# Patient Record
Sex: MALE | Race: WHITE | NOT HISPANIC OR LATINO | Employment: OTHER | URBAN - METROPOLITAN AREA
[De-identification: names, ages, dates, MRNs, and addresses within clinical notes are randomized per-mention and may not be internally consistent; named-entity substitution may affect disease eponyms.]

---

## 2023-09-08 ENCOUNTER — TELEPHONE (OUTPATIENT)
Dept: PRIMARY CARE | Facility: CLINIC | Age: 43
End: 2023-09-08
Payer: COMMERCIAL

## 2023-09-22 ENCOUNTER — OFFICE VISIT (OUTPATIENT)
Dept: PRIMARY CARE | Facility: CLINIC | Age: 43
End: 2023-09-22
Payer: COMMERCIAL

## 2023-09-22 VITALS
HEIGHT: 73 IN | HEART RATE: 83 BPM | SYSTOLIC BLOOD PRESSURE: 106 MMHG | WEIGHT: 315 LBS | BODY MASS INDEX: 41.75 KG/M2 | DIASTOLIC BLOOD PRESSURE: 70 MMHG | TEMPERATURE: 98.2 F

## 2023-09-22 DIAGNOSIS — E66.01 MORBID OBESITY WITH BMI OF 40.0-44.9, ADULT (MULTI): ICD-10-CM

## 2023-09-22 DIAGNOSIS — F51.04 CHRONIC INSOMNIA: ICD-10-CM

## 2023-09-22 DIAGNOSIS — G47.62 LEG CRAMPS, SLEEP RELATED: ICD-10-CM

## 2023-09-22 DIAGNOSIS — Z00.00 VISIT FOR PREVENTIVE HEALTH EXAMINATION: Primary | ICD-10-CM

## 2023-09-22 PROCEDURE — 4004F PT TOBACCO SCREEN RCVD TLK: CPT | Performed by: FAMILY MEDICINE

## 2023-09-22 PROCEDURE — 99396 PREV VISIT EST AGE 40-64: CPT | Performed by: FAMILY MEDICINE

## 2023-09-22 PROCEDURE — 3008F BODY MASS INDEX DOCD: CPT | Performed by: FAMILY MEDICINE

## 2023-09-22 RX ORDER — METOPROLOL SUCCINATE 25 MG/1
25 TABLET, EXTENDED RELEASE ORAL DAILY
COMMUNITY

## 2023-09-22 RX ORDER — ASPIRIN 81 MG/1
81 TABLET ORAL ONCE
COMMUNITY
Start: 2020-02-03

## 2023-09-22 RX ORDER — TICAGRELOR 60 MG/1
60 TABLET ORAL 2 TIMES DAILY
COMMUNITY
Start: 2023-09-14

## 2023-09-22 RX ORDER — CYCLOBENZAPRINE HCL 10 MG
10 TABLET ORAL NIGHTLY
Qty: 90 TABLET | Refills: 3 | Status: SHIPPED | OUTPATIENT
Start: 2023-09-22

## 2023-09-22 RX ORDER — ATORVASTATIN CALCIUM 80 MG/1
80 TABLET, FILM COATED ORAL DAILY
COMMUNITY

## 2023-09-22 RX ORDER — CYCLOBENZAPRINE HCL 10 MG
10 TABLET ORAL NIGHTLY
COMMUNITY
End: 2023-09-22 | Stop reason: SDUPTHER

## 2023-09-22 ASSESSMENT — PATIENT HEALTH QUESTIONNAIRE - PHQ9
SUM OF ALL RESPONSES TO PHQ9 QUESTIONS 1 AND 2: 0
1. LITTLE INTEREST OR PLEASURE IN DOING THINGS: NOT AT ALL
2. FEELING DOWN, DEPRESSED OR HOPELESS: NOT AT ALL

## 2023-09-22 NOTE — PROGRESS NOTES
"Subjective   Patient ID: Grey Garcia is a 42 y.o. male who presents for Annual Exam.  Very pleasant 42-year-old here today for annual wellness exam  Has history of leg cramps at night managed with cyclobenzaprine with no side effects or issues  Has morbid obesity has been trying to work on diet and exercise  No new family history  No hospital ER visits or surgery or interval changes  Tries to exercise no angina palpitations or syncope  No  symptoms patient is not a smoker  Does not take any supplements or aspirin  No other problems to report he feels well        Review of Systems  Constitutional: no chills, no fever and no night sweats.   Eyes: no blurred vision and no eyesight problems.   ENT: no hearing loss, no nasal congestion, no nasal discharge, no hoarseness and no sore throat.   Cardiovascular: no chest pain, no intermittent leg claudication, no lower extremity edema, no palpitations and no syncope.   Respiratory: no cough, no shortness of breath during exertion, no shortness of breath at rest and no wheezing.   Gastrointestinal: no abdominal pain, no blood in stools, no constipation, no diarrhea, no melena, no nausea, no rectal pain and no vomiting.   Genitourinary: no dysuria, no change in urinary frequency, no urinary hesitancy, no feelings of urinary urgency and no vaginal discharge.   Musculoskeletal: no arthralgias,  no back pain and no myalgias.   Integumentary: no new skin lesions and no rashes.   Neurological: no difficulty walking, no headache, no limb weakness, no numbness and no tingling.   Psychiatric: no anxiety, no depression, no anhedonia and no substance use disorders.   Endocrine: no recent weight gain and no recent weight loss.   Hematologic/Lymphatic: no tendency for easy bruising and no swollen glands .    Objective    /70   Pulse 83   Temp 36.8 °C (98.2 °F)   Ht 1.854 m (6' 1\")   Wt 146 kg (322 lb)   BMI 42.48 kg/m²    Physical Exam  The patient appeared well nourished " and normally developed. Vital signs as documented. Head exam is unremarkable. No scleral icterus or corneal arcus noted.  Pupils are equal round reactive to light extraocular movements are intact no hemorrhages noted on funduscopic exam mouth mucous membranes are moist no exudates ears canals clear TMs are gray pearly not injected nose no rhinorrhea or epistaxis Neck is without jugular venous distension, thyromegaly, or carotid bruits. Carotid upstrokes are brisk bilaterally. Lungs are clear to auscultation and percussion. Cardiac exam reveals the PMI to be normally sized and situated. Rhythm is regular. First and second heart sounds normal. No murmurs, rubs or gallops. Abdominal exam reveals normal bowel sounds, no masses, no organomegaly and no aortic enlargement. Extremities are nonedematous and both femoral and pedal pulses are normal.  Neurologic exam DTRs are equal bilaterally no focal deficits strength is symmetrical heme lymph no palpable lymph nodes in the neck axilla or groin    Assessment/Plan   Problem List Items Addressed This Visit       Chronic insomnia    Relevant Medications    cyclobenzaprine (Flexeril) 10 mg tablet    Leg cramps, sleep related    Relevant Medications    cyclobenzaprine (Flexeril) 10 mg tablet    Visit for preventive health examination - Primary     Normal adult exam  Morbid obesity diet and exercise addressed  Continue annual exams         Relevant Orders    Lipid Panel    Hemoglobin A1C    Comprehensive Metabolic Panel    Hepatitis C antibody    Morbid obesity with BMI of 40.0-44.9, adult (CMS/HCC)     Above normal BMI nutrition and physical activity reviewed          Normal adult exam  Continue annual exams  Above normal BMI nutrition and physical activity reviewed  Leg cramps causing insomnia  Continue the cyclobenzaprine  Weight loss will also help  Continue annual exams       Juli Delvalle MD

## 2023-09-30 PROBLEM — E66.01 MORBID OBESITY WITH BMI OF 40.0-44.9, ADULT (MULTI): Status: ACTIVE | Noted: 2023-09-30

## 2023-09-30 PROBLEM — Z00.00 VISIT FOR PREVENTIVE HEALTH EXAMINATION: Status: ACTIVE | Noted: 2023-09-30

## 2023-10-23 DIAGNOSIS — F41.1 GENERALIZED ANXIETY DISORDER: ICD-10-CM

## 2023-10-25 RX ORDER — ESCITALOPRAM OXALATE 10 MG/1
10 TABLET ORAL DAILY
Qty: 90 TABLET | Refills: 0 | Status: SHIPPED | OUTPATIENT
Start: 2023-10-25 | End: 2024-01-23

## 2023-12-06 ENCOUNTER — OFFICE VISIT (OUTPATIENT)
Dept: SLEEP MEDICINE | Facility: CLINIC | Age: 43
End: 2023-12-06
Payer: COMMERCIAL

## 2023-12-06 DIAGNOSIS — G47.33 OBSTRUCTIVE SLEEP APNEA: Primary | ICD-10-CM

## 2023-12-06 PROCEDURE — 4004F PT TOBACCO SCREEN RCVD TLK: CPT | Performed by: NURSE PRACTITIONER

## 2023-12-06 PROCEDURE — 3008F BODY MASS INDEX DOCD: CPT | Performed by: NURSE PRACTITIONER

## 2023-12-06 PROCEDURE — 99213 OFFICE O/P EST LOW 20 MIN: CPT | Performed by: NURSE PRACTITIONER

## 2023-12-06 ASSESSMENT — SLEEP AND FATIGUE QUESTIONNAIRES
HOW LIKELY ARE YOU TO NOD OFF OR FALL ASLEEP WHEN YOU ARE A PASSENGER IN A CAR FOR AN HOUR WITHOUT A BREAK: NO CHANCE OF DOZING
ESS TOTAL SCORE: 1
HOW LIKELY ARE YOU TO NOD OFF OR FALL ASLEEP WHILE SITTING AND TALKING TO SOMEONE: NO CHANCE OF DOZING
HOW LIKELY ARE YOU TO NOD OFF OR FALL ASLEEP WHILE LYING DOWN TO REST IN THE AFTERNOON WHEN CIRCUMSTANCES PERMIT: SLIGHT CHANCE OF DOZING
SITING INACTIVE IN A PUBLIC PLACE LIKE A CLASS ROOM OR A MOVIE THEATER: NO CHANCE OF DOZING
HOW LIKELY ARE YOU TO NOD OFF OR FALL ASLEEP IN A CAR, WHILE STOPPED FOR A FEW MINUTES IN TRAFFIC: NO CHANCE OF DOZING
HOW LIKELY ARE YOU TO NOD OFF OR FALL ASLEEP WHILE SITTING AND READING: NO CHANCE OF DOZING
HOW LIKELY ARE YOU TO NOD OFF OR FALL ASLEEP WHILE WATCHING TV: NO CHANCE OF DOZING
HOW LIKELY ARE YOU TO NOD OFF OR FALL ASLEEP WHILE SITTING QUIETLY AFTER LUNCH WITHOUT ALCOHOL: NO CHANCE OF DOZING

## 2023-12-06 NOTE — PROGRESS NOTES
Patient: Grey Garcia    [id number]  : 1980 -- AGE 43 y.o.    Provider: GRACIELA Mckinney     Location [facility]   Service Date: 2023              [facility]  Followup Visit Note    Virtual or Telephone Consent  An interactive audio and video telecommunication system which permits real time communications between the patient (at the originating site) and provider (at the distant site) was utilized to provide this telehealth service.   Verbal consent was requested and obtained from Grey Garcia on this date, 23 for a telehealth visit.     HISTORY OF PRESENT ILLNESS       HISTORY OF PRESENT ILLNESS   Grey Garcia is a 43 y.o. male with past medical history of  anxiety, insomnia, smoking, hyperlipidemia, HTN, MAURICE who presents to a [facility] for followup.     PAST SLEEP HISTORY    Sleep study done on  showed severe MAURICE with an RDI 3% of 75.1 and SpO2 sharla of 68%. Recommendation was for auto PAP.     CURRENT SLEEP HISTORY    On today's visit, the patient reports tolerating PAP well. No issues with mask, air pressure. Feels he sleeps well. 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 He had asked about travel unit in the past, but has not yet purchased. He is comfortable with current mask. Recently ordered new supplies.     RLS Followup: denies    Insomnia follow up: denies     REVIEW OF SYSTEMS     REVIEW OF SYSTEMS  Review of Systems   All other systems reviewed and are negative.      ALLERGIES AND MEDICATIONS     ALLERGIES  No Known Allergies    MEDICATIONS  Current Outpatient Medications   Medication Sig Dispense Refill    amlodipine-valsartan (Exforge) 5-160 mg tablet TAKE 1 TABLET BY MOUTH EVERY DAY 90 tablet 0    aspirin 81 mg EC tablet Take 1 tablet (81 mg) by mouth 1 time.      atorvastatin (Lipitor) 80 mg tablet Take 1 tablet (80 mg) by mouth once daily.       Brilinta 60 mg tablet Take 1 tablet (60 mg) by mouth 2 times a day.      cyclobenzaprine (Flexeril) 10 mg tablet Take 1 tablet (10 mg) by mouth once daily at bedtime. 90 tablet 3    escitalopram (Lexapro) 10 mg tablet TAKE 1 TABLET BY MOUTH EVERY DAY 90 tablet 0    metoprolol succinate XL (Toprol-XL) 25 mg 24 hr tablet Take 1 tablet (25 mg) by mouth once daily.       No current facility-administered medications for this visit.       PPAST MEDICAL HISTORY  Past Medical History:   Diagnosis Date    Personal history of diseases of the skin and subcutaneous tissue 06/28/2021    History of folliculitis    Personal history of other diseases of the respiratory system 01/01/2015    History of sinusitis       PAST SURGICAL HISTORY:  Past Surgical History:   Procedure Laterality Date    OTHER SURGICAL HISTORY  02/03/2020    Coronary artery stent placement    OTHER SURGICAL HISTORY  02/03/2020    Vasectomy       FAMILY HISTORY  Family History   Problem Relation Name Age of Onset    No Known Problems Mother      Heart disease Father         FAMILY HISTORY: No changes since previous visit. Otherwise non-contributory as charted.       SOCIAL HISTORY  He  reports that he has been smoking cigarettes. He has been smoking an average of 1.5 packs per day. He has never used smokeless tobacco. He reports current alcohol use. He reports that he does not use drugs.       PHYSICAL EXAM     VITAL SIGNS: There were no vitals taken for this visit.     PREVIOUS WEIGHTS:  Wt Readings from Last 3 Encounters:   09/22/23 146 kg (322 lb)   08/08/22 (!) 141 kg (311 lb 4 oz)   12/17/21 (!) 140 kg (307 lb 8.7 oz)       Physical Exam    CONSTITUTIONAL: Patient appears well developed and well nourished.   GENERAL: This is a healthy appearing adult who appears stated age. The patient is alert and appropriately verbally conversant without hoarseness. This patient is in no apparent distress. Not acutely ill or toxic appearing.   FACE: The face was  "inspected and no cutaneous masses or lesions were visualized. There was no erythema or edema noted. Facial movement was symmetric.   EYES: Extra-ocular muscle function was intact. No nystagmus was observed. Pupils were equal.   CRANIAL NERVES: Cranial nerves II, III, IV, and VI were noted to be intact via extra-ocular muscle movement testing. Cranial nerve VII noted to be intact and symmetric by facial movement. Cranial nerve XII noted to be intact by active and symmetric tongue movement.   NOSE: Examination of the nose revealed the nasal dorsum to be midline.   ORAL CAVITY: Examination of the oral cavity limited due to virtual nature of visit. The lips were free of lesion. Gums were free of inflammation.   OROPHARYNX: Difficult to visualize due to virtual exam  EARS: Examination of the ears revealed that the auricles were normally formed with no lesions.   NECK: No skin lesions or inflammatory processes were detected on visual inspection.  RESPIRATORY: Symmetrical inspiration and expiration and chest wall expansion, no use of accessory muscles to breathe, no stridor.  NEUROLOGICAL: Mentation is clear. Alert and oriented to time, place, and person. Answering questions appropriately.  PSYCHIATRIC: Normal affect and appropriate behavior. Mood is without obvious agitation or disturbance. No evidence of obvious depressive behaviors.        RESULTS/DATA     No results found for: \"CO2\", \"IRON\", \"TRANSFERRIN\", \"IRONSAT\", \"TIBC\", \"FERRITIN\"        Resmed 10-15-21                 Air Touch F20 med                 Due for supplies 3-4-24    ASSESSMENT/PLAN     Mr. Garcia is a 43 y.o. male and He returns in followup to the [facility] for MAURICE.    Problem List and Orders  Problem List Items Addressed This Visit             ICD-10-CM    Obstructive sleep apnea - Primary G47.33     8/6/ 21 showed severe MAURICE with an RDI 3% of 75.1 and SpO2 sharla of 68%    Currently on 5-15 Epr of 3 via [facility]  Supply " order updated today  RTC in 12 mo            Disposition    Return to clinic in 12 months

## 2023-12-06 NOTE — ASSESSMENT & PLAN NOTE
8/6/ 21 showed severe MAURICE with an RDI 3% of 75.1 and SpO2 sharla of 68%    Currently on 5-15 Epr of 3 via [facility]  Supply order updated today  RTC in 12 mo

## 2024-01-22 DIAGNOSIS — I10 ESSENTIAL (PRIMARY) HYPERTENSION: ICD-10-CM

## 2024-01-22 DIAGNOSIS — F41.1 GENERALIZED ANXIETY DISORDER: ICD-10-CM

## 2024-01-23 RX ORDER — ESCITALOPRAM OXALATE 10 MG/1
10 TABLET ORAL DAILY
Qty: 90 TABLET | Refills: 2 | Status: SHIPPED | OUTPATIENT
Start: 2024-01-23

## 2024-01-23 RX ORDER — AMLODIPINE AND VALSARTAN 5; 160 MG/1; MG/1
1 TABLET ORAL DAILY
Qty: 90 TABLET | Refills: 2 | Status: SHIPPED | OUTPATIENT
Start: 2024-01-23

## 2024-12-03 DIAGNOSIS — E78.2 MIXED HYPERLIPIDEMIA: Primary | ICD-10-CM

## 2024-12-03 DIAGNOSIS — F41.1 GENERALIZED ANXIETY DISORDER: ICD-10-CM

## 2024-12-03 DIAGNOSIS — I10 ESSENTIAL (PRIMARY) HYPERTENSION: ICD-10-CM

## 2024-12-03 DIAGNOSIS — I10 PRIMARY HYPERTENSION: ICD-10-CM

## 2024-12-03 RX ORDER — ESCITALOPRAM OXALATE 10 MG/1
10 TABLET ORAL DAILY
Qty: 30 TABLET | Refills: 0 | Status: SHIPPED | OUTPATIENT
Start: 2024-12-03 | End: 2024-12-03

## 2024-12-03 RX ORDER — METOPROLOL SUCCINATE 25 MG/1
25 TABLET, EXTENDED RELEASE ORAL DAILY
Qty: 30 TABLET | Refills: 0 | Status: SHIPPED | OUTPATIENT
Start: 2024-12-03

## 2024-12-03 RX ORDER — ATORVASTATIN CALCIUM 80 MG/1
80 TABLET, FILM COATED ORAL DAILY
Qty: 30 TABLET | Refills: 0 | Status: SHIPPED | OUTPATIENT
Start: 2024-12-03

## 2024-12-03 RX ORDER — AMLODIPINE AND VALSARTAN 5; 160 MG/1; MG/1
1 TABLET ORAL DAILY
Qty: 30 TABLET | Refills: 0 | Status: SHIPPED | OUTPATIENT
Start: 2024-12-03 | End: 2024-12-03

## 2025-01-31 ENCOUNTER — APPOINTMENT (OUTPATIENT)
Dept: PRIMARY CARE | Facility: CLINIC | Age: 45
End: 2025-01-31
Payer: COMMERCIAL

## 2025-03-13 DIAGNOSIS — G47.62 LEG CRAMPS, SLEEP RELATED: ICD-10-CM

## 2025-03-13 DIAGNOSIS — F51.04 CHRONIC INSOMNIA: ICD-10-CM

## 2025-03-13 RX ORDER — CYCLOBENZAPRINE HCL 10 MG
10 TABLET ORAL NIGHTLY
Qty: 90 TABLET | Refills: 0 | OUTPATIENT
Start: 2025-03-13

## 2025-03-28 ENCOUNTER — APPOINTMENT (OUTPATIENT)
Dept: SLEEP MEDICINE | Facility: CLINIC | Age: 45
End: 2025-03-28
Payer: COMMERCIAL

## 2025-03-31 ENCOUNTER — TELEPHONE (OUTPATIENT)
Dept: PRIMARY CARE | Facility: CLINIC | Age: 45
End: 2025-03-31
Payer: COMMERCIAL

## 2025-03-31 DIAGNOSIS — G47.62 LEG CRAMPS, SLEEP RELATED: ICD-10-CM

## 2025-03-31 DIAGNOSIS — F51.04 CHRONIC INSOMNIA: ICD-10-CM

## 2025-03-31 RX ORDER — CYCLOBENZAPRINE HCL 10 MG
10 TABLET ORAL NIGHTLY
Qty: 90 TABLET | Refills: 0 | Status: SHIPPED | OUTPATIENT
Start: 2025-03-31

## 2025-03-31 NOTE — TELEPHONE ENCOUNTER
Grey called for a refill on     cyclobenzaprine (Flexeril) 10 mg tablet   Take 1 tablet (10 mg) by mouth once daily at bedtime.   LF 12/5/24    LV 9/22/23  NV 5/5/25    [facility]  [address]  [phone]

## 2025-04-04 ENCOUNTER — APPOINTMENT (OUTPATIENT)
Dept: PRIMARY CARE | Facility: CLINIC | Age: 45
End: 2025-04-04
Payer: COMMERCIAL

## 2025-05-05 ENCOUNTER — APPOINTMENT (OUTPATIENT)
Dept: PRIMARY CARE | Facility: CLINIC | Age: 45
End: 2025-05-05
Payer: COMMERCIAL

## 2025-05-05 VITALS
OXYGEN SATURATION: 95 % | HEIGHT: 73 IN | WEIGHT: 280 LBS | BODY MASS INDEX: 37.11 KG/M2 | DIASTOLIC BLOOD PRESSURE: 67 MMHG | SYSTOLIC BLOOD PRESSURE: 102 MMHG | TEMPERATURE: 97.7 F | HEART RATE: 64 BPM

## 2025-05-05 DIAGNOSIS — I10 PRIMARY HYPERTENSION: ICD-10-CM

## 2025-05-05 DIAGNOSIS — E66.01 CLASS 2 SEVERE OBESITY WITH SERIOUS COMORBIDITY AND BODY MASS INDEX (BMI) OF 36.0 TO 36.9 IN ADULT, UNSPECIFIED OBESITY TYPE: ICD-10-CM

## 2025-05-05 DIAGNOSIS — R73.03 PREDIABETES: ICD-10-CM

## 2025-05-05 DIAGNOSIS — F41.1 GENERALIZED ANXIETY DISORDER: ICD-10-CM

## 2025-05-05 DIAGNOSIS — Z12.11 COLON CANCER SCREENING: ICD-10-CM

## 2025-05-05 DIAGNOSIS — E66.812 CLASS 2 SEVERE OBESITY WITH SERIOUS COMORBIDITY AND BODY MASS INDEX (BMI) OF 36.0 TO 36.9 IN ADULT, UNSPECIFIED OBESITY TYPE: ICD-10-CM

## 2025-05-05 DIAGNOSIS — E78.2 MIXED HYPERLIPIDEMIA: ICD-10-CM

## 2025-05-05 DIAGNOSIS — F51.04 CHRONIC INSOMNIA: ICD-10-CM

## 2025-05-05 DIAGNOSIS — Z00.00 ANNUAL PHYSICAL EXAM: Primary | ICD-10-CM

## 2025-05-05 DIAGNOSIS — G47.62 LEG CRAMPS, SLEEP RELATED: ICD-10-CM

## 2025-05-05 PROBLEM — F17.210 CIGARETTE SMOKER: Status: ACTIVE | Noted: 2025-05-05

## 2025-05-05 PROBLEM — I51.9 HEART DISEASE: Status: ACTIVE | Noted: 2025-05-05

## 2025-05-05 PROBLEM — E78.9 LIPID DISORDER: Status: RESOLVED | Noted: 2018-10-22 | Resolved: 2025-05-05

## 2025-05-05 PROBLEM — I21.02 ST ELEVATION MYOCARDIAL INFARCTION INVOLVING LEFT ANTERIOR DESCENDING (LAD) CORONARY ARTERY (MULTI): Status: RESOLVED | Noted: 2018-10-19 | Resolved: 2025-05-05

## 2025-05-05 PROBLEM — R94.39 ABNORMAL STRESS ECG: Status: ACTIVE | Noted: 2017-07-26

## 2025-05-05 PROBLEM — Z98.61 POST PTCA: Status: ACTIVE | Noted: 2018-10-22

## 2025-05-05 PROBLEM — R07.2 PRECORDIAL PAIN: Status: RESOLVED | Noted: 2017-07-26 | Resolved: 2025-05-05

## 2025-05-05 PROBLEM — I49.01 CARDIAC ARREST WITH VENTRICULAR FIBRILLATION: Status: RESOLVED | Noted: 2018-10-19 | Resolved: 2025-05-05

## 2025-05-05 PROBLEM — I46.9 CARDIAC ARREST WITH VENTRICULAR FIBRILLATION: Status: RESOLVED | Noted: 2018-10-19 | Resolved: 2025-05-05

## 2025-05-05 PROBLEM — R06.81 WITNESSED EPISODE OF APNEA: Status: RESOLVED | Noted: 2025-05-05 | Resolved: 2025-05-05

## 2025-05-05 PROBLEM — L11.1 GROVER'S DISEASE: Status: RESOLVED | Noted: 2025-05-05 | Resolved: 2025-05-05

## 2025-05-05 PROBLEM — F17.200 NICOTINE USE DISORDER: Status: ACTIVE | Noted: 2018-10-20

## 2025-05-05 PROBLEM — I25.10 ARTERIOSCLEROSIS OF CORONARY ARTERY: Status: ACTIVE | Noted: 2017-08-17

## 2025-05-05 PROCEDURE — 4004F PT TOBACCO SCREEN RCVD TLK: CPT | Performed by: FAMILY MEDICINE

## 2025-05-05 PROCEDURE — 3008F BODY MASS INDEX DOCD: CPT | Performed by: FAMILY MEDICINE

## 2025-05-05 PROCEDURE — 99213 OFFICE O/P EST LOW 20 MIN: CPT | Performed by: FAMILY MEDICINE

## 2025-05-05 PROCEDURE — 99396 PREV VISIT EST AGE 40-64: CPT | Performed by: FAMILY MEDICINE

## 2025-05-05 PROCEDURE — 3078F DIAST BP <80 MM HG: CPT | Performed by: FAMILY MEDICINE

## 2025-05-05 PROCEDURE — 3074F SYST BP LT 130 MM HG: CPT | Performed by: FAMILY MEDICINE

## 2025-05-05 RX ORDER — ESCITALOPRAM OXALATE 10 MG/1
20 TABLET ORAL DAILY
Qty: 180 TABLET | Refills: 3 | Status: SHIPPED | OUTPATIENT
Start: 2025-05-05

## 2025-05-05 RX ORDER — ATORVASTATIN CALCIUM 80 MG/1
80 TABLET, FILM COATED ORAL DAILY
Qty: 90 TABLET | Refills: 3 | Status: SHIPPED | OUTPATIENT
Start: 2025-05-05

## 2025-05-05 RX ORDER — TIRZEPATIDE 7.5 MG/.5ML
7.5 INJECTION, SOLUTION SUBCUTANEOUS WEEKLY
Qty: 6 ML | Refills: 3 | Status: SHIPPED | OUTPATIENT
Start: 2025-05-05

## 2025-05-05 RX ORDER — AMLODIPINE AND VALSARTAN 5; 160 MG/1; MG/1
1 TABLET ORAL DAILY
Qty: 90 TABLET | Refills: 3 | Status: SHIPPED | OUTPATIENT
Start: 2025-05-05

## 2025-05-05 RX ORDER — METOPROLOL SUCCINATE 25 MG/1
25 TABLET, EXTENDED RELEASE ORAL DAILY
Qty: 90 TABLET | Refills: 3 | Status: SHIPPED | OUTPATIENT
Start: 2025-05-05

## 2025-05-05 RX ORDER — CYCLOBENZAPRINE HCL 10 MG
10 TABLET ORAL NIGHTLY
Qty: 90 TABLET | Refills: 3 | Status: SHIPPED | OUTPATIENT
Start: 2025-05-05

## 2025-05-05 ASSESSMENT — PROMIS GLOBAL HEALTH SCALE
EMOTIONAL_PROBLEMS: RARELY
RATE_MENTAL_HEALTH: VERY GOOD
RATE_GENERAL_HEALTH: GOOD
CARRYOUT_SOCIAL_ACTIVITIES: VERY GOOD
RATE_PHYSICAL_HEALTH: GOOD
RATE_SOCIAL_SATISFACTION: VERY GOOD
RATE_QUALITY_OF_LIFE: GOOD
CARRYOUT_PHYSICAL_ACTIVITIES: COMPLETELY
RATE_AVERAGE_PAIN: 3

## 2025-05-05 NOTE — PROGRESS NOTES
Subjective   Patient ID: Grey Garcia is a 44 y.o. male who presents for Med Refill (Pt is here for med refill ).  Annual physical exam and followup meds  No side effects  Trying to quit smoking  No hospital or ED visits   Leg cramps   Anxiety stable on escitalopram    Hypertension  This is a chronic problem. The current episode started more than 1 year ago. The problem has been gradually improving since onset. The problem is controlled. Associated symptoms include anxiety. Pertinent negatives include no blurred vision, chest pain, headaches, malaise/fatigue, neck pain, orthopnea, palpitations, peripheral edema, PND, shortness of breath or sweats. There are no associated agents to hypertension. Risk factors for coronary artery disease include dyslipidemia, family history, male gender and smoking/tobacco exposure. Past treatments include ACE inhibitors, beta blockers, calcium channel blockers and lifestyle changes. The current treatment provides significant improvement. Compliance problems include medication cost.  There is no history of angina, kidney disease, CAD/MI, CVA, heart failure, left ventricular hypertrophy, PVD or retinopathy. There is no history of chronic renal disease, coarctation of the aorta, hyperaldosteronism, hypercortisolism, hyperparathyroidism, a hypertension causing med, pheochromocytoma, renovascular disease, sleep apnea or a thyroid problem.   Hyperlipidemia  This is a chronic problem. The current episode started more than 1 year ago. The problem is controlled. Recent lipid tests were reviewed and are variable. He has no history of chronic renal disease, diabetes, hypothyroidism, liver disease, obesity or nephrotic syndrome. Factors aggravating his hyperlipidemia include beta blockers and smoking. Associated symptoms include myalgias. Pertinent negatives include no chest pain, focal sensory loss, focal weakness, leg pain or shortness of breath. Current antihyperlipidemic treatment  includes diet change, exercise and statins. The current treatment provides moderate improvement of lipids. There are no compliance problems.  Risk factors for coronary artery disease include dyslipidemia, family history, male sex, hypertension and obesity.   Anxiety  Presents for follow-up visit. Symptoms include nervous/anxious behavior. Patient reports no chest pain, compulsions, confusion, decreased concentration, depressed mood, dizziness, dry mouth, excessive worry, feeling of choking, hyperventilation, impotence, insomnia, irritability, malaise, muscle tension, nausea, obsessions, palpitations, panic, restlessness, shortness of breath or suicidal ideas. Symptoms occur most days. The severity of symptoms is mild. The quality of sleep is good. Nighttime awakenings: occasional.     Compliance with medications is %.       Review of Systems   Constitutional:  Negative for irritability and malaise/fatigue.   Eyes:  Negative for blurred vision.   Respiratory:  Negative for shortness of breath.    Cardiovascular:  Negative for chest pain, palpitations, orthopnea and PND.   Gastrointestinal:  Negative for nausea.   Genitourinary:  Negative for impotence.   Musculoskeletal:  Positive for myalgias. Negative for neck pain.   Neurological:  Negative for dizziness, focal weakness and headaches.   Psychiatric/Behavioral:  Negative for confusion, decreased concentration and suicidal ideas. The patient is nervous/anxious. The patient does not have insomnia.      Constitutional: no chills, no fever and no night sweats.   Eyes: no blurred vision and no eyesight problems.   ENT: no hearing loss, no nasal congestion, no nasal discharge, no hoarseness and no sore throat.   Cardiovascular: no chest pain, no intermittent leg claudication, no lower extremity edema, no palpitations and no syncope.   Respiratory: no cough, no shortness of breath during exertion, no shortness of breath at rest and no wheezing.   Gastrointestinal:  "no abdominal pain, no blood in stools, no constipation, no diarrhea, no melena, no nausea, no rectal pain and no vomiting.   Genitourinary: no dysuria, no change in urinary frequency, no urinary hesitancy, no feelings of urinary urgency and no vaginal discharge.   Musculoskeletal: no arthralgias,  no back pain and no myalgias.   Integumentary: no new skin lesions and no rashes.   Neurological: no difficulty walking, no headache, no limb weakness, no numbness and no tingling.   Psychiatric: no anxiety, no depression, no anhedonia and no substance use disorders.   Endocrine: no recent weight gain and no recent weight loss.   Hematologic/Lymphatic: no tendency for easy bruising and no swollen glands .  '  Objective    /67   Pulse 64   Temp 36.5 °C (97.7 °F)   Ht 1.854 m (6' 1\")   Wt 127 kg (280 lb)   SpO2 95%   BMI 36.94 kg/m²    Physical Exam  The patient appeared well nourished and normally developed. Vital signs as documented. Head exam is unremarkable. No scleral icterus or corneal arcus noted.  Pupils are equal round reactive to light extraocular movements are intact no hemorrhages noted on funduscopic exam mouth mucous membranes are moist no exudates ears canals clear TMs are gray pearly not injected nose no rhinorrhea or epistaxis Neck is without jugular venous distension, thyromegaly, or carotid bruits. Carotid upstrokes are brisk bilaterally. Lungs are clear to auscultation and percussion. Cardiac exam reveals the PMI to be normally sized and situated. Rhythm is regular. First and second heart sounds normal. No murmurs, rubs or gallops. Abdominal exam reveals normal bowel sounds, no masses, no organomegaly and no aortic enlargement. Extremities are nonedematous and both femoral and pedal pulses are normal.  Neurologic exam DTRs are equal bilaterally no focal deficits strength is symmetrical heme lymph no palpable lymph nodes in the neck axilla or groin    Assessment/Plan   Problem List Items " Addressed This Visit       Chronic insomnia    Relevant Medications    cyclobenzaprine (Flexeril) 10 mg tablet    Leg cramps, sleep related    Relevant Medications    cyclobenzaprine (Flexeril) 10 mg tablet    Annual physical exam - Primary    Relevant Orders    Comprehensive metabolic panel    Lipid panel    Hemoglobin A1C    Hepatitis C antibody    Generalized anxiety disorder    Relevant Medications    escitalopram (Lexapro) 10 mg tablet    Mixed hyperlipidemia    Relevant Medications    atorvastatin (Lipitor) 80 mg tablet    Primary hypertension    Relevant Medications    metoprolol succinate XL (Toprol-XL) 25 mg 24 hr tablet    amlodipine-valsartan (Exforge) 5-160 mg tablet     Other Visit Diagnoses         Prediabetes        Relevant Medications    tirzepatide (Mounjaro) 7.5 mg/0.5 mL pen injector      Colon cancer screening        Relevant Orders    Cologuard® colon cancer screening                 Juli Delvalle MD

## 2025-05-11 PROBLEM — E66.01 CLASS 2 SEVERE OBESITY WITH SERIOUS COMORBIDITY AND BODY MASS INDEX (BMI) OF 36.0 TO 36.9 IN ADULT: Status: ACTIVE | Noted: 2025-05-11

## 2025-05-11 PROBLEM — E66.812 CLASS 2 SEVERE OBESITY WITH SERIOUS COMORBIDITY AND BODY MASS INDEX (BMI) OF 36.0 TO 36.9 IN ADULT: Status: ACTIVE | Noted: 2025-05-11

## 2025-05-11 ASSESSMENT — ENCOUNTER SYMPTOMS
ORTHOPNEA: 0
DIZZINESS: 0
DECREASED CONCENTRATION: 0
NERVOUS/ANXIOUS: 1
DEPRESSED MOOD: 0
COMPULSIONS: 0
PALPITATIONS: 0
INSOMNIA: 0
HYPERTENSION: 1
HYPERVENTILATION: 0
BLURRED VISION: 0
SHORTNESS OF BREATH: 0
MUSCLE TENSION: 0
PND: 0
PANIC: 0
RESTLESSNESS: 0
FOCAL SENSORY LOSS: 0
CONFUSION: 0
LEG PAIN: 0
MALAISE: 0
FOCAL WEAKNESS: 0
THOUGHT CONTENT - OBSESSIONS: 0
SWEATS: 0
HEADACHES: 0
FEELING OF CHOKING: 0
NAUSEA: 0
IRRITABILITY: 0
MYALGIAS: 1
NECK PAIN: 0

## 2025-05-14 ENCOUNTER — TELEPHONE (OUTPATIENT)
Dept: PRIMARY CARE | Facility: CLINIC | Age: 45
End: 2025-05-14
Payer: COMMERCIAL

## 2025-05-14 NOTE — TELEPHONE ENCOUNTER
Pt said that his [facility] denied his Mounjaro, needs a prior authorization.

## 2025-05-16 DIAGNOSIS — E66.01 CLASS 2 SEVERE OBESITY WITH SERIOUS COMORBIDITY AND BODY MASS INDEX (BMI) OF 36.0 TO 36.9 IN ADULT, UNSPECIFIED OBESITY TYPE: Primary | ICD-10-CM

## 2025-05-16 DIAGNOSIS — E66.812 CLASS 2 SEVERE OBESITY WITH SERIOUS COMORBIDITY AND BODY MASS INDEX (BMI) OF 36.0 TO 36.9 IN ADULT, UNSPECIFIED OBESITY TYPE: Primary | ICD-10-CM

## 2025-05-22 ENCOUNTER — TELEPHONE (OUTPATIENT)
Dept: PRIMARY CARE | Facility: CLINIC | Age: 45
End: 2025-05-22
Payer: COMMERCIAL

## 2025-05-22 NOTE — TELEPHONE ENCOUNTER
Grey called and the insurance isn't approving his Zepbound, probably needs prior authorization. Please call him when done.

## 2025-07-03 ENCOUNTER — OFFICE VISIT (OUTPATIENT)
Dept: SLEEP MEDICINE | Facility: CLINIC | Age: 45
End: 2025-07-03
Payer: COMMERCIAL

## 2025-07-03 DIAGNOSIS — G47.33 OSA (OBSTRUCTIVE SLEEP APNEA): Primary | ICD-10-CM

## 2025-07-03 PROCEDURE — 4004F PT TOBACCO SCREEN RCVD TLK: CPT | Performed by: NURSE PRACTITIONER

## 2025-07-03 PROCEDURE — 99214 OFFICE O/P EST MOD 30 MIN: CPT | Mod: 95 | Performed by: NURSE PRACTITIONER

## 2025-07-03 PROCEDURE — 99214 OFFICE O/P EST MOD 30 MIN: CPT | Performed by: NURSE PRACTITIONER

## 2025-07-03 ASSESSMENT — PATIENT HEALTH QUESTIONNAIRE - PHQ9
2. FEELING DOWN, DEPRESSED OR HOPELESS: NOT AT ALL
1. LITTLE INTEREST OR PLEASURE IN DOING THINGS: NOT AT ALL
SUM OF ALL RESPONSES TO PHQ9 QUESTIONS 1 AND 2: 0

## 2025-07-03 ASSESSMENT — ENCOUNTER SYMPTOMS
OCCASIONAL FEELINGS OF UNSTEADINESS: 0
LOSS OF SENSATION IN FEET: 0
DEPRESSION: 0

## 2025-07-03 ASSESSMENT — COLUMBIA-SUICIDE SEVERITY RATING SCALE - C-SSRS
2. HAVE YOU ACTUALLY HAD ANY THOUGHTS OF KILLING YOURSELF?: NO
6. HAVE YOU EVER DONE ANYTHING, STARTED TO DO ANYTHING, OR PREPARED TO DO ANYTHING TO END YOUR LIFE?: NO
1. IN THE PAST MONTH, HAVE YOU WISHED YOU WERE DEAD OR WISHED YOU COULD GO TO SLEEP AND NOT WAKE UP?: NO

## 2025-07-03 NOTE — PROGRESS NOTES
Patient: Grey Garcia    [id number]  : 1980 -- AGE 44 y.o.    Provider: GRACIELA Mckinney     Location [facility]   Service Date: 7/3/2025              [facility]  Followup Visit Note    Virtual or Telephone Consent  An interactive audio and video telecommunication system which permits real time communications between the patient (at the originating site) and provider (at the distant site) was utilized to provide this telehealth service.   Verbal consent was requested and obtained from Grey Garcia on this date, 25 for a telehealth visit.     HISTORY OF PRESENT ILLNESS       HISTORY OF PRESENT ILLNESS   Grey Garcia is a 44 y.o. male with past medical history of  anxiety, insomnia, smoking, hyperlipidemia, HTN, MAURICE who presents to a [facility] for followup.     PAST SLEEP HISTORY    Sleep study done on  showed severe MAURICE with an RDI 3% of 75.1 and SpO2 sharla of 68%. Recommendation was for auto PAP.     CURRENT SLEEP HISTORY    On today's visit, the patient reports tolerating PAP well. No issues with mask, air pressure. Feels he sleeps well. 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 He had asked about travel unit in the past, but has not yet purchased. He is comfortable with current mask. Recently ordered new supplies.     Stopped covering mounjaro- lost ~ 60 lbs since starting.   Reports he felt better when he was taking it     RLS Followup: denies    Insomnia follow up: denies     REVIEW OF SYSTEMS     REVIEW OF SYSTEMS  Review of Systems   All other systems reviewed and are negative.      ALLERGIES AND MEDICATIONS     ALLERGIES  No Known Allergies    MEDICATIONS  Current Outpatient Medications   Medication Sig Dispense Refill    amlodipine-valsartan (Exforge) 5-160 mg tablet Take 1 tablet by mouth once daily. 90 tablet 3    aspirin 81 mg EC tablet  Take 1 tablet (81 mg) by mouth 1 time.      atorvastatin (Lipitor) 80 mg tablet Take 1 tablet (80 mg) by mouth once daily. 90 tablet 3    Brilinta 60 mg tablet Take 1 tablet (60 mg) by mouth 2 times a day.      cyclobenzaprine (Flexeril) 10 mg tablet Take 1 tablet (10 mg) by mouth once daily at bedtime. 90 tablet 3    escitalopram (Lexapro) 10 mg tablet Take 2 tablets (20 mg) by mouth once daily. 180 tablet 3    metoprolol succinate XL (Toprol-XL) 25 mg 24 hr tablet Take 1 tablet (25 mg) by mouth once daily. 90 tablet 3    tirzepatide (Mounjaro) 7.5 mg/0.5 mL pen injector Inject 7.5 mg under the skin 1 (one) time per week. (Patient not taking: Reported on 7/3/2025) 6 mL 3    tirzepatide, weight loss, (Zepbound) 5 mg/0.5 mL injection Inject 5 mg under the skin every 7 days. (Patient not taking: Reported on 7/3/2025) 4 each 11     No current facility-administered medications for this visit.       PPAST MEDICAL HISTORY  Past Medical History:   Diagnosis Date    Cardiac arrest with ventricular fibrillation 10/19/2018    RESOLVED      Coronary artery disease 8/172018    Personal history of diseases of the skin and subcutaneous tissue 06/28/2021    History of folliculitis    Personal history of other diseases of the respiratory system 01/01/2015    History of sinusitis    Snoring     ST elevation myocardial infarction involving left anterior descending (LAD) coronary artery (Multi) 10/19/2018    Assesment:  -STEMI with EKG elevation in V2-V5; found to have 100% occlusion of mid LAD stent and 100%  of RCA. Underwent IVUS guided PCI of mid LAD with aspiration thrombectomy followed by PTCA with a 3.5mm NC balloon  -bedside echo shows apical hypokinesis, EF 40-45% by our estimate  -LATER he Vfib arrested a second time. ROSC was achieved within 1-2 minutes. EKG taken notable for ST elevatio    Witnessed episode of apnea 05/05/2025       PAST SURGICAL HISTORY:  Past Surgical History:   Procedure Laterality Date    CARDIAC  CATHETERIZATION  8/17/2020    OTHER SURGICAL HISTORY  02/03/2020    Coronary artery stent placement    OTHER SURGICAL HISTORY  02/03/2020    Vasectomy       FAMILY HISTORY  Family History   Problem Relation Name Age of Onset    No Known Problems Mother      Heart disease Father         FAMILY HISTORY: No changes since previous visit. Otherwise non-contributory as charted.       SOCIAL HISTORY  He  reports that he has been smoking cigarettes. He has a 22.5 pack-year smoking history. He uses smokeless tobacco. He reports current alcohol use. He reports that he does not use drugs.       PHYSICAL EXAM     VITAL SIGNS: There were no vitals taken for this visit.     PREVIOUS WEIGHTS:  Wt Readings from Last 3 Encounters:   05/05/25 127 kg (280 lb)   09/22/23 146 kg (322 lb)   08/08/22 (!) 141 kg (311 lb 4 oz)       Physical Exam    CONSTITUTIONAL: Patient appears well developed and well nourished.   GENERAL: This is a healthy appearing adult who appears stated age. The patient is alert and appropriately verbally conversant without hoarseness. This patient is in no apparent distress. Not acutely ill or toxic appearing.   FACE: The face was inspected and no cutaneous masses or lesions were visualized. There was no erythema or edema noted. Facial movement was symmetric.   EYES: Extra-ocular muscle function was intact. No nystagmus was observed. Pupils were equal.   CRANIAL NERVES: Cranial nerves II, III, IV, and VI were noted to be intact via extra-ocular muscle movement testing. Cranial nerve VII noted to be intact and symmetric by facial movement. Cranial nerve XII noted to be intact by active and symmetric tongue movement.   NOSE: Examination of the nose revealed the nasal dorsum to be midline.   ORAL CAVITY: Examination of the oral cavity limited due to virtual nature of visit. The lips were free of lesion. Gums were free of inflammation.   OROPHARYNX: Difficult to visualize due to virtual exam  EARS: Examination of the  "ears revealed that the auricles were normally formed with no lesions.   NECK: No skin lesions or inflammatory processes were detected on visual inspection.  RESPIRATORY: Symmetrical inspiration and expiration and chest wall expansion, no use of accessory muscles to breathe, no stridor.  NEUROLOGICAL: Mentation is clear. Alert and oriented to time, place, and person. Answering questions appropriately.  PSYCHIATRIC: Normal affect and appropriate behavior. Mood is without obvious agitation or disturbance. No evidence of obvious depressive behaviors.        RESULTS/DATA     No results found for: \"CO2\", \"IRON\", \"TRANSFERRIN\", \"IRONSAT\", \"TIBC\", \"FERRITIN\"        Airsense 11 Autoset setup 10/15/21    AirTouch F20 Med    [facility]- [facility]    Last supplies 11/29/24    ASSESSMENT/PLAN     Mr. Garcia is a 44 y.o. male and He returns in followup to the [facility] for MAURICE.    Problem List and Orders  Problem List Items Addressed This Visit           ICD-10-CM    MAURICE (obstructive sleep apnea) - Primary G47.33    Relevant Orders    Positive Airway Pressure (PAP) Therapy         Disposition    Return to clinic in 12 months       "

## 2025-07-03 NOTE — PATIENT INSTRUCTIONS
ZepBound- tirzepatide for MAURICE           [facility]  [facility] [address]  [address] [address]       NAME: Grey Garcia   DATE:  07/03/25    DIAGNOSIS:   1. MAURICE (obstructive sleep apnea)  Positive Airway Pressure (PAP) Therapy          Thank you for coming to the Sleep Medicine Clinic today! Your sleep medicine provider today was: Valencia Siu, GIOVANY-CNP Below is a summary of your treatment plan, other important information, and our contact numbers:    TREATMENT PLAN:   - Follow-up in 12 months.  - If not already done, sign up for 'My Chart' and send prescription requests or messages through this    Weight loss at     Weight loss can be a strategy to improve or cure sleep apnea.  [facility] offers a variety of options to assist with weight loss including:  Nutrition services [phone])  Weight management clinics with our endocrinology partners [phone])  Bariatric services [phone]) including options for weight loss medications or weight loss surgery.   [facility] ([phone]) offer holistic approaches to weight management.     Please consider discussing a referral to one of these services with your provider to assist in your care.        Annual Reminders About Your Sleep Apnea    Your sleep apnea is well controlled based on reviewing your PAP Data Report.     General Reminders:  Continue current machine settings. Continue using machine every night. Need at least 4 hours daily usage.   Remember to clean your mask, tubings, and water chamber regularly as instructed.  Know the replacement schedule of your supplies/ accessories and contact your DME (durable medical equipment) provider if you are due for them.  Avoid driving or operating heavy machinery when drowsy. A person driving while sleepy is 5 times more likely to have an accident. If you feel sleepy, pull over and take a  short power nap (sleep for less than 30 minutes). Otherwise, ask somebody to drive you.    Follow-up sooner through MyChart or calling our office if you have any of the following symptoms:  Snoring or stopping breathing while using the machine  Recurrence of fatigue, sleepiness, insomnia, and other symptoms you had prior using machine  Persistent or worsening fatigue or sleepiness despite regular use of machine  Issues tolerating the machine like bloating sensation, air hunger, too much hot air, too much pressure, taking off mask without recall in the middle of sleep, etc.     Other conservative measures to improve sleep apnea:  Losing weight can lead to some improvement of MAURICE which means you will need lower pressures in machine to control your MAURICE. In some patients, they don't need to use the machine after bariatric surgery. Hence, consider medical and/or surgical weight loss especially if your BMI is more than 35.  Avoiding alcohol, sedative-hypnotics, and sedating medications is imperative as these substances can worsen snoring and sleep apnea  If you have nasal congestion or seasonal allergies, improving your breathing through the nose is critical for treating sleep apnea, tolerating CPAP, and improving your sleep; hence, using intranasal steroid spray like Flonase might help. Make sure you know the proper way to use it.  Stay off your back when sleeping.    Common issues with PAP machine:  Mask gets dislodged when turning to the side: Consider getting a CPAP pillow or switching to a mask with hose on top.     Dry mouth:  Your machine has built-in humidifier that heats up the air to prevent dry mouth. It can be adjusted to your comfort. You can try that first and increase setting one level one night at a time to check which setting is comfortable and effective in lessening dry mouth. If dry mouth persists despite humidity setting adjustment, may apply OTC Biotene gel over the gums at bedtime.  If Biotene gel is  "not effective, consider trying XEROSTOM gel from [employer].  Also, eliminate or reduce dose of meds that can cause dry mouth if possible. Lastly, may try getting a separate room humidifier machine.    Airleaks: Please call DME as they may need to adjust your mask or refit you with a different kind of mask. In addition, you can ask DME for tips on getting a good mask seal and mask fit.     Difficulty tolerating the mask: Contact your DME to try a different kind of mask and/or call office to get a referral to Sleep Psychologist for CPAP desensitization. CPAP desensitization technique is a set of strategies that helps patient cope with claustrophobia and anxiety related to wearing mask. Alternatively, we can do a daytime mini-sleep study called PAP-nap trial wherein you will try on different kinds of mask and the sleep technician will try different pressure settings on CPAP and BPAP machines to see which specific pressure is tolerable and comfortable for you.     Water droplets or moisture within the hose and/or mask: This is called rain-out and it is caused by condensation of too much heated humidity on the cooler walls of the hose. If you have rain-out, turn down humidity settings or get a heated hose. If you already have a heated hose, turn up the \"tube temperature\" of the heated hose. Alternatively, if you don't want to get a heated hose or warmer air, may wrap the CPAP hose with stockings to keep it somewhat warm. Also, you need to place the machine on the floor and lower the hose so that water won't travel upward towards your mask.     You can also go to the following EDUCATION WEBSITES for further information:   American Academy of Sleep Medicine http://sleepeducation.org  National Sleep Foundation: https://sleepfoundation.org  American Sleep Apnea Association: https://www.sleepapnea.org (for patients with sleep apnea)    Here at [facility], we wish you a restful sleep!     Instructions - Common MAURICE " Recs: - For your sleep apnea, continue to use your PAP every night and use it whenever you are sleeping.   - Avoid alcohol or sedatives several hours prior to sleeping.   - Get additional supplies for your PAP (e.g., mask, hose, filters) every 3 months or as your insurance allows from your [email or web address] company. Replacement cushions for your PAP mask can be requested monthly if airseals are an issue.  - Remember to clean your mask, tubings, and water chamber regularly as instructed.  - Avoid driving or operating heavy machinery when drowsy. A person driving while sleepy is five (5) times more likely to have an accident. If you feel sleepy, pull over and take a short power nap (sleep for less than 30 minutes). Otherwise, ask somebody to drive you.    EASY WAYS TO IMPROVE YOUR SLEEP:  1. Go to bed and wake up at the same time every day.   Aim for 8 hours but some people need less, some need more.   Get out of bed if you are not sleeping.   Limit naps to 20 min or less.   2. Expose yourself to daylight and/ or bright light in the morning.   Go outside or spend time near a window each morning.   You can use a light box (found on Amazon) if you wake before the sunrise.   Limit light exposure in the evenings (including electronic usage).   Try meditation, reading, stretching, deep breathing, warm shower or bath, or yoga nidra as part of your bedtime routine. There are many great FREE, videos or audio tracks on YouTube/ mcTEL, etc for guidance.  3. Exercise, in some form, EVERY day, but not too close to bedtime. Consider making this part of your routine at the start of your day, followed by a cool shower.  4. Eat meals at roughly the same time every day. Make sure you are prioritizing fruits, vegetables, whole grains, lean proteins.  5. Time your caffeine intake. Make sure you are not drinking caffeine within 8 to 12 hours prior to your bedtime.   6. Avoid marijuana, alcohol, and nicotine. They will reduce sleep quality in any  quantity.  7. Learn to manage anxiety. Psychology services at  can be reached at [phone] to schedule an appointment.     IMPORTANT INFORMATION:     Call 911 for medical emergencies.  Our offices are generally open from Monday-Friday, 9 am - 5 pm.  If you need to get in touch with me, you may either call me and my team(number is below) or you can use [employer].  If a referral for a test, for CPAP, or for another specialist was made, and you have not heard about scheduling this within a week, please call scheduling at [phone] ([phone]).  If you are unable to make your appointment for clinic or an overnight study, kindly call the office at least 48 hours in advance to cancel and reschedule.  If you are on CPAP, please bring your device's card to each clinic appointment unless told otherwise by your provider.  There are no supporting services by either the sleep doctors or their staff on weekends and Holidays, or after 5 PM on weekdays.   If you have been asked to come to a sleep study, make sure you bring toiletries, a comfy pillow, and any nighttime medications that you may regularly take. Also be sure to eat dinner before you arrive. We generally do not provide meals.      PRESCRIPTIONS:  We require 7 days advanced notice for prescription refills. If we do not receive the request in this time, we cannot guarantee that your medication will be refilled in time. Please contact the sleep nurses listed below for refills or request via [employer].     IMPORTANT PHONE NUMBERS:   Sleep Medicine Clinic Fax: [phone]  Appointments (for Pediatric Sleep Clinic): [phone] - option 1  Appointments (for Adult Sleep Clinic): [phone] - option 2  Appointments (For Sleep Studies): [phone] - option 3  Behavioral Sleep Medicine: [phone]  Sleep Surgery: [phone]  ENT (Otolaryngology): [phone]  Headache Clinic (Neurology): [phone]  Neurology: [phone]  Psychiatry:  [phone]  Pulmonary Function Testing (PFT) Center: [phone]  Pulmonary Medicine: [phone]  [employer] (DME): [phone]  [employer] (DME): [phone]  [facility] (DME): [phone]    Our Adult Sleep Medicine Team (Please do not hesitate to call the office or sleep nurse with any questions between appointments):    Adult Sleep Nurses (Lawanda Villegas RN and Zahra Elliott RN):  For clinical questions and refilling prescriptions: [phone]  Email sleep diaries and other documents at: [email or web address]    Adult Sleep Medicine Secretaries:  Anitha Yanes (For Candice/Romero/Krsandrine/Deana/Yeh): P: [phone]  F: [phone]  Wilfredo Jonas ([place])Office: [phone] option 4 Office Fax: [phone]  Sana Brandt (For [place]ley): P: [phone]  Fax: [phone]  Alivia Laura (For Jurcevic/Blank): P: [phone]  F: [phone]  Freya Sosa (For Rosibel): P: [phone]  F: [phone]  Shaina Wolf (For Oriana/Yudy/Zakhary): P: [phone]  F: [phone]  Lula Newman (For Mahad/Sofia): P: [phone]  F: [phone]     Adult Sleep Medicine Advanced Practice Providers:  Jourdan Moralez ([place], [place])  Polina Jimenes ([facility])  Valencia Siu CNP ([place], [place], [place])  Olivia Alva CNP ([place], [place], [place])  Merlene Gleason ([place], [place], [place])  Lashell Sofia CNP ([facility])      Our Sleep Testing Center (STC) Locations:  Our team will contact you to schedule your sleep study, however, you can contact us as follow:  Main Phone Line (scheduling only): [phone]), option 3  Adult and Pediatric Locations  [facility] (6 years and older): [facility] - 4th floor ([address] [place] [address]) After hours line: [phone]  [facility] (Main campus: All ages): [place], 6th floor. After hours line:  "[phone]   [place] (5 years and older; younger considered on case-by-case basis): [address]; [address], [address]. Scheduling  After hours line: [phone]   [place] (6 years and older): [address]; [address]; [address]   [place] (6 years and older): [address] [facility] [address] [facility], [address]  After hours line: [phone]   [place] (13 years and older) in [place]: [address], 2nd floor  After hours line: [phone]   [place] (13 year and older): [address]  After hours line: [phone] (Home studies out of [facility])    Adult Only Locations:   [place] (18 years and older): [address] [facility], 2nd floor   [place] (18 years and older): [address]; 4th floor  After hours line: [phone]  [facility] (18 years and older) at [place]: [id number] [place] [address]  After hours line: [phone]        CONTACTING YOUR SLEEP MEDICINE PROVIDER:  Send a message directly to your provider through \"My Chart\", which is the email service through your  Records Account: [email or web address]   Call [phone] and leave a message. One of the administrative assistants will forward the message to your sleep medicine provider through \"My Chart\" and/or email.     Your sleep medicine provider for this visit was: GIOVANY Mckinney-CNP    In the event that you are running more than 15 minutes late to your appointment, I will kindly ask you to reschedule.       "

## 2025-07-09 LAB
NON-UH HIE A/G RATIO: 1.2
NON-UH HIE ALB: 4.2 G/DL (ref 3.4–5)
NON-UH HIE ALK PHOS: 73 UNIT/L (ref 45–117)
NON-UH HIE BILIRUBIN, TOTAL: 0.4 MG/DL (ref 0.3–1.2)
NON-UH HIE BUN/CREAT RATIO: 16.7
NON-UH HIE BUN: 15 MG/DL (ref 9–23)
NON-UH HIE CALCIUM: 10 MG/DL (ref 8.7–10.4)
NON-UH HIE CALCULATED LDL CHOLESTEROL: 70 MG/DL (ref 60–130)
NON-UH HIE CALCULATED OSMOLALITY: 282 MOSM/KG (ref 275–295)
NON-UH HIE CHLORIDE: 106 MMOL/L (ref 98–107)
NON-UH HIE CHOLESTEROL: 139 MG/DL (ref 100–200)
NON-UH HIE CO2, VENOUS: 28 MMOL/L (ref 20–31)
NON-UH HIE CREATININE: 0.9 MG/DL (ref 0.6–1.1)
NON-UH HIE GFR AA: >60
NON-UH HIE GLOBULIN: 3.4 G/DL
NON-UH HIE GLOMERULAR FILTRATION RATE: >60 ML/MIN/1.73M?
NON-UH HIE GLUCOSE: 89 MG/DL (ref 74–106)
NON-UH HIE GOT: 33 UNIT/L (ref 15–37)
NON-UH HIE GPT: 67 UNIT/L (ref 10–49)
NON-UH HIE HDL CHOLESTEROL: 36 MG/DL (ref 40–60)
NON-UH HIE HGB A1C: 5.5 %
NON-UH HIE K: 4 MMOL/L (ref 3.5–5.1)
NON-UH HIE NA: 141 MMOL/L (ref 135–145)
NON-UH HIE TOTAL CHOL/HDL CHOL RATIO: 3.9
NON-UH HIE TOTAL PROTEIN: 7.6 G/DL (ref 5.7–8.2)
NON-UH HIE TRIGLYCERIDES: 165 MG/DL (ref 30–150)

## 2025-07-10 DIAGNOSIS — E66.812 CLASS 2 SEVERE OBESITY WITH SERIOUS COMORBIDITY AND BODY MASS INDEX (BMI) OF 36.0 TO 36.9 IN ADULT, UNSPECIFIED OBESITY TYPE: ICD-10-CM

## 2025-07-10 DIAGNOSIS — E66.01 CLASS 2 SEVERE OBESITY WITH SERIOUS COMORBIDITY AND BODY MASS INDEX (BMI) OF 36.0 TO 36.9 IN ADULT, UNSPECIFIED OBESITY TYPE: ICD-10-CM

## 2025-07-10 DIAGNOSIS — R73.03 PREDIABETES: ICD-10-CM

## 2025-07-10 RX ORDER — TIRZEPATIDE 7.5 MG/.5ML
7.5 INJECTION, SOLUTION SUBCUTANEOUS WEEKLY
Qty: 6 ML | Refills: 3 | Status: SHIPPED | OUTPATIENT
Start: 2025-07-10

## 2025-08-15 DIAGNOSIS — E66.01 CLASS 2 SEVERE OBESITY WITH SERIOUS COMORBIDITY AND BODY MASS INDEX (BMI) OF 36.0 TO 36.9 IN ADULT, UNSPECIFIED OBESITY TYPE: Primary | ICD-10-CM

## 2025-08-15 DIAGNOSIS — E66.812 CLASS 2 SEVERE OBESITY WITH SERIOUS COMORBIDITY AND BODY MASS INDEX (BMI) OF 36.0 TO 36.9 IN ADULT, UNSPECIFIED OBESITY TYPE: Primary | ICD-10-CM

## 2025-08-15 RX ORDER — SEMAGLUTIDE 0.5 MG/.5ML
0.5 INJECTION, SOLUTION SUBCUTANEOUS WEEKLY
Qty: 6 ML | Refills: 3 | Status: SHIPPED | OUTPATIENT
Start: 2025-08-15

## 2026-07-09 ENCOUNTER — APPOINTMENT (OUTPATIENT)
Dept: SLEEP MEDICINE | Facility: CLINIC | Age: 46
End: 2026-07-09
Payer: COMMERCIAL